# Patient Record
Sex: FEMALE | URBAN - METROPOLITAN AREA
[De-identification: names, ages, dates, MRNs, and addresses within clinical notes are randomized per-mention and may not be internally consistent; named-entity substitution may affect disease eponyms.]

---

## 2021-03-08 ENCOUNTER — APPOINTMENT (RX ONLY)
Dept: URBAN - METROPOLITAN AREA CLINIC 151 | Facility: CLINIC | Age: 41
Setting detail: DERMATOLOGY
End: 2021-03-08

## 2021-03-08 DIAGNOSIS — Z71.89 OTHER SPECIFIED COUNSELING: ICD-10-CM

## 2021-03-08 DIAGNOSIS — L81.1 CHLOASMA: ICD-10-CM

## 2021-03-08 PROCEDURE — 99202 OFFICE O/P NEW SF 15 MIN: CPT

## 2021-03-08 PROCEDURE — ? IN-HOUSE DISPENSING PHARMACY

## 2021-03-08 PROCEDURE — ? COUNSELING

## 2021-03-08 ASSESSMENT — LOCATION ZONE DERM
LOCATION ZONE: LIP
LOCATION ZONE: FACE

## 2021-03-08 ASSESSMENT — LOCATION DETAILED DESCRIPTION DERM
LOCATION DETAILED: LEFT UPPER CUTANEOUS LIP
LOCATION DETAILED: LEFT INFERIOR CENTRAL MALAR CHEEK

## 2021-03-08 ASSESSMENT — LOCATION SIMPLE DESCRIPTION DERM
LOCATION SIMPLE: LEFT LIP
LOCATION SIMPLE: LEFT CHEEK

## 2021-03-08 NOTE — PROCEDURE: IN-HOUSE DISPENSING PHARMACY
Product 17 Refills: 0
Product 12 Unit Type: mg
Product 1 Units Dispensed: 1
Product 1 Unit Type: grams
Send Charges To Patient Encounter: Yes
Name Of Product 1: Hydroquinone 4% emulsion
Product 1 Application Directions: As directed
Product 1 Price/Unit (In Dollars): 44.00
Detail Level: Zone
Product 1 Amount/Unit (Numbers Only): 30
Product 1 Refills: 3